# Patient Record
Sex: FEMALE | Race: BLACK OR AFRICAN AMERICAN | NOT HISPANIC OR LATINO | Employment: FULL TIME | URBAN - METROPOLITAN AREA
[De-identification: names, ages, dates, MRNs, and addresses within clinical notes are randomized per-mention and may not be internally consistent; named-entity substitution may affect disease eponyms.]

---

## 2021-05-11 ENCOUNTER — OFFICE VISIT (OUTPATIENT)
Dept: FAMILY MEDICINE CLINIC | Facility: CLINIC | Age: 36
End: 2021-05-11

## 2021-05-11 VITALS
RESPIRATION RATE: 18 BRPM | HEART RATE: 80 BPM | WEIGHT: 160 LBS | TEMPERATURE: 97.7 F | SYSTOLIC BLOOD PRESSURE: 110 MMHG | HEIGHT: 62 IN | OXYGEN SATURATION: 97 % | BODY MASS INDEX: 29.44 KG/M2 | DIASTOLIC BLOOD PRESSURE: 60 MMHG

## 2021-05-11 DIAGNOSIS — Z72.0 TOBACCO ABUSE: ICD-10-CM

## 2021-05-11 DIAGNOSIS — J45.21 MILD INTERMITTENT ASTHMA WITH ACUTE EXACERBATION: Primary | ICD-10-CM

## 2021-05-11 DIAGNOSIS — B96.89 BACTERIAL VAGINOSIS: ICD-10-CM

## 2021-05-11 DIAGNOSIS — N76.0 BACTERIAL VAGINOSIS: ICD-10-CM

## 2021-05-11 DIAGNOSIS — J30.2 SEASONAL ALLERGIC RHINITIS, UNSPECIFIED TRIGGER: ICD-10-CM

## 2021-05-11 PROBLEM — J45.909 ASTHMA: Status: ACTIVE | Noted: 2018-05-16

## 2021-05-11 PROCEDURE — 99203 OFFICE O/P NEW LOW 30 MIN: CPT | Performed by: NURSE PRACTITIONER

## 2021-05-11 RX ORDER — EPINEPHRINE INHALATION 125 UG/1
AEROSOL RESPIRATORY (INHALATION)
COMMUNITY

## 2021-05-11 RX ORDER — UBIDECARENONE 75 MG
50 CAPSULE ORAL DAILY
COMMUNITY

## 2021-05-11 RX ORDER — ALBUTEROL SULFATE 90 UG/1
2 AEROSOL, METERED RESPIRATORY (INHALATION) EVERY 4 HOURS PRN
Qty: 18 G | Refills: 3 | Status: SHIPPED | OUTPATIENT
Start: 2021-05-11

## 2021-05-11 RX ORDER — ALBUTEROL SULFATE 2.5 MG/3ML
2.5 SOLUTION RESPIRATORY (INHALATION) EVERY 4 HOURS PRN
Qty: 120 EACH | Refills: 5 | Status: SHIPPED | OUTPATIENT
Start: 2021-05-11

## 2021-05-11 RX ORDER — MULTIPLE VITAMINS W/ MINERALS TAB 9MG-400MCG
TAB ORAL
COMMUNITY

## 2021-05-11 RX ORDER — METRONIDAZOLE 500 MG/1
500 TABLET ORAL 2 TIMES DAILY
Qty: 14 TABLET | Refills: 3 | Status: SHIPPED | OUTPATIENT
Start: 2021-05-11

## 2021-05-11 NOTE — PROGRESS NOTES
"Chief Complaint  Establish Care    Subjective          Marilynn Liriano presents to [facility]  History of Present Illness  Here to [facility], works [employer] for [employer], prev PCP Dr [facility], lives with 2.5 yr son and child's father  With chronic asthma diagnosed as a child and notes this is her trigger season with allergies and heat, was prev using albuterol inhaler prn but ran out and waiting for new PCP and using primatine mist OTC, notes son recently brought home viral illness now with SOA wheezing and prod cough, no fever, no sinus pain, sore throat or ear pain, no prev COVID-19 vaccines, request nebulizer prev used in hospital, no prev maintenance inhaler, with chronic allergies on OTC currently with trigger season, smoker 2-3 cigars daily plans to quit in the next year  Sees GYN Dr Joseph has IUD with hx of recurrent BV average 6 times a year using metronidazole prn and currently taking    Objective   Vital Signs:   /60 (BP Location: Left arm, Patient Position: Sitting)   Pulse 80   Temp 97.7 °F (36.5 °C) (Infrared)   Resp 18   Ht 157.5 cm (62\")   Wt 72.6 kg (160 lb)   SpO2 97%   BMI 29.26 kg/m²     Physical Exam  Vitals reviewed.   Constitutional:       Appearance: She is well-developed.   HENT:      Head: Normocephalic and atraumatic.   Eyes:      Conjunctiva/sclera: Conjunctivae normal.      Pupils: Pupils are equal, round, and reactive to light.   Cardiovascular:      Rate and Rhythm: Normal rate and regular rhythm.      Pulses: Normal pulses.      Heart sounds: Normal heart sounds.   Pulmonary:      Effort: Pulmonary effort is normal.      Breath sounds: Wheezing (TIMA) present.   Abdominal:      General: There is no distension.      Palpations: Abdomen is soft. There is no mass.      Tenderness: There is no abdominal tenderness. There is no right CVA tenderness, left CVA tenderness, guarding or rebound.      Hernia: No " hernia is present.   Musculoskeletal:         General: Normal range of motion.      Cervical back: Normal range of motion.   Skin:     General: Skin is warm and dry.   Neurological:      Mental Status: She is alert and oriented to person, place, and time.   Psychiatric:         Mood and Affect: Mood normal.         Behavior: Behavior normal.         Thought Content: Thought content normal.         Judgment: Judgment normal.        Result Review :                 Assessment and Plan    Diagnoses and all orders for this visit:    1. Mild intermittent asthma with acute exacerbation (Primary)    2. Seasonal allergic rhinitis, unspecified trigger    3. Bacterial vaginosis    4. Tobacco abuse    Other orders  -     metroNIDAZOLE (Flagyl) 500 MG tablet; Take 1 tablet by mouth 2 (Two) Times a Day.  Dispense: 14 tablet; Refill: 3  -     albuterol sulfate  (90 Base) MCG/ACT inhaler; Inhale 2 puffs Every 4 (Four) Hours As Needed for Wheezing or Shortness of Air.  Dispense: 18 g; Refill: 3  -     albuterol (PROVENTIL) (2.5 MG/3ML) 0.083% nebulizer solution; Take 2.5 mg by nebulization Every 4 (Four) Hours As Needed for Wheezing or Shortness of Air.  Dispense: 120 each; Refill: 5        Follow Up   No follow-ups on file.  Patient was given instructions and counseling regarding her condition or for health maintenance advice. Please see specific information pulled into the AVS if appropriate.   Wheezes today restart albuterol inhaler prn and order nebulizer machine with albuterol vials at home, cont daily allergy OTC PO and encourage smoking cessation, discussed chronic BV and refill metronidazole, encouraged pt to review natural probiotic for vaginal health and FU with me or GYN prn exam if persist or worsen

## 2021-05-11 NOTE — PATIENT INSTRUCTIONS
Wheezes today restart albuterol inhaler prn and order nebulizer machine with albuterol vials at home, cont daily allergy OTC PO and encourage smoking cessation, discussed chronic BV and refill metronidazole, encouraged pt to review natural probiotic for vaginal health and FU with me or GYN prn exam if persist or worsen